# Patient Record
Sex: FEMALE | Race: WHITE | NOT HISPANIC OR LATINO | Employment: UNEMPLOYED | URBAN - METROPOLITAN AREA
[De-identification: names, ages, dates, MRNs, and addresses within clinical notes are randomized per-mention and may not be internally consistent; named-entity substitution may affect disease eponyms.]

---

## 2023-07-25 ENCOUNTER — TRANSCRIBE ORDERS (OUTPATIENT)
Dept: OBSTETRICS AND GYNECOLOGY | Facility: HOSPITAL | Age: 33
End: 2023-07-25
Payer: MEDICAID

## 2023-07-25 DIAGNOSIS — O28.3 ABNORMAL FETAL ULTRASOUND: Primary | ICD-10-CM

## 2023-07-25 DIAGNOSIS — O98.419 CHRONIC HEPATITIS C COMPLICATING PREGNANCY, ANTEPARTUM: ICD-10-CM

## 2023-07-25 DIAGNOSIS — O35.EXX0 RENAL AGENESIS OF FETUS AFFECTING ANTEPARTUM CARE OF MOTHER, SINGLE OR UNSPECIFIED FETUS: ICD-10-CM

## 2023-07-25 DIAGNOSIS — B18.2 CHRONIC HEPATITIS C COMPLICATING PREGNANCY, ANTEPARTUM: ICD-10-CM

## 2023-07-25 DIAGNOSIS — Z34.90 PREGNANCY, UNSPECIFIED GESTATIONAL AGE: ICD-10-CM

## 2023-07-25 DIAGNOSIS — O99.320 SUBSTANCE ABUSE AFFECTING PREGNANCY, ANTEPARTUM: ICD-10-CM

## 2023-09-13 ENCOUNTER — OFFICE VISIT (OUTPATIENT)
Dept: OBSTETRICS AND GYNECOLOGY | Facility: HOSPITAL | Age: 33
End: 2023-09-13
Payer: MEDICAID

## 2023-09-13 ENCOUNTER — HOSPITAL ENCOUNTER (OUTPATIENT)
Dept: WOMENS IMAGING | Facility: HOSPITAL | Age: 33
Discharge: HOME | End: 2023-09-13
Admitting: NURSE PRACTITIONER
Payer: MEDICAID

## 2023-09-13 VITALS
BODY MASS INDEX: 32.96 KG/M2 | HEIGHT: 63 IN | DIASTOLIC BLOOD PRESSURE: 47 MMHG | WEIGHT: 186 LBS | SYSTOLIC BLOOD PRESSURE: 110 MMHG

## 2023-09-13 DIAGNOSIS — O99.320 SUBSTANCE ABUSE AFFECTING PREGNANCY, ANTEPARTUM: ICD-10-CM

## 2023-09-13 DIAGNOSIS — O35.EXX0 RENAL AGENESIS OF FETUS AFFECTING ANTEPARTUM CARE OF MOTHER, SINGLE OR UNSPECIFIED FETUS: ICD-10-CM

## 2023-09-13 DIAGNOSIS — B18.2 CHRONIC HEPATITIS C COMPLICATING PREGNANCY, ANTEPARTUM: ICD-10-CM

## 2023-09-13 DIAGNOSIS — Z87.59 HISTORY OF UNEXPLAINED STILLBIRTH: ICD-10-CM

## 2023-09-13 DIAGNOSIS — Z34.90 PREGNANCY, UNSPECIFIED GESTATIONAL AGE: ICD-10-CM

## 2023-09-13 DIAGNOSIS — Q60.0 UNILATERAL RENAL AGENESIS: Primary | ICD-10-CM

## 2023-09-13 DIAGNOSIS — O98.419 CHRONIC HEPATITIS C COMPLICATING PREGNANCY, ANTEPARTUM: ICD-10-CM

## 2023-09-13 DIAGNOSIS — F19.11 HISTORY OF SUBSTANCE ABUSE: ICD-10-CM

## 2023-09-13 DIAGNOSIS — Z14.8 ALPHA-1-ANTITRYPSIN DEFICIENCY CARRIER: ICD-10-CM

## 2023-09-13 DIAGNOSIS — O28.3 ABNORMAL FETAL ULTRASOUND: ICD-10-CM

## 2023-09-13 DIAGNOSIS — Z3A.27 27 WEEKS GESTATION OF PREGNANCY: ICD-10-CM

## 2023-09-13 PROCEDURE — 76811 OB US DETAILED SNGL FETUS: CPT

## 2023-09-13 RX ORDER — CETIRIZINE HYDROCHLORIDE 10 MG/1
10 TABLET ORAL AS NEEDED
COMMUNITY
Start: 2023-08-30

## 2023-09-13 RX ORDER — PRENATAL VIT/IRON FUM/FOLIC AC 27MG-0.8MG
TABLET ORAL DAILY
COMMUNITY

## 2023-09-13 NOTE — ASSESSMENT & PLAN NOTE
The incidence of unilateral renal agenesis is approximately 1 in 1000 births (male to female ratio 1:1). When seen in isolation, without evidence of other structural abnormalities, this is not felt to be associated with chromosomal or genetic abnormalities, but is usually secondary to a vascular accident.     I reviewed with the patient and her  that children born with one functional kidney have normal life expectancy/quality of life. I recommended simply that they inform their Pediatrician of the possibility of a vestigial kidney so that the /infant could be evaluated after delivery.     I scheduled a follow-up ultrasound in 4 wks for reevaluation and growth.    Parents and siblings should be offered renal ultrasonograms to assess for unilateral renal agenesis or other renal anomalies.

## 2023-09-13 NOTE — LETTER
"2023     NAJMA Ballard  [address] [facility] [address]    Patient: Sandra Justice   YOB: 1990   Date of Visit: 2023       Dear NAJMA Ballard,    Thank you for referring Sandra Justice to me for evaluation. Below is a copy of my consult note.    If you have questions, please do not hesitate to call me. I look forward to following Sandra along with you.         Sincerely,        Michelle Larson MD        CC: No Recipients          Maternal/Fetal Medicine Consult Note     Name: Sandra Justice    : 1990     MRN: [id number]     Referring Provider: EUSEBIO Ballard*    Chief Complaint  Right renal agenesis, h/o IUFD at 15wks, maternal carrier for alpha 1 antitrypsin deficiency, h/o substance abuse, Hep C antibody pos    Subjective     History of Present Illness:  Sandra Justice is a 33 y.o.  27w5d who presents today for fetal anatomic survey. She did not have genetic screening done.     She denies LOF/VB/ctx's.     JANNA: Estimated Date of Delivery: 23     ROS:   As noted in HPI.     Past Medical History:   Diagnosis Date   • Carrier of alpha-1-antitrypsin deficiency     pt reports she is carrier   • Family history of alpha 1 antitrypsin deficiency     previous child - age 10 years - different fob   • Family history of heart valve abnormality     pt's father had a deffective heart valve replaced at age 28 and had pacemaker      History reviewed. No pertinent surgical history.   OB History          4    Para   2    Term   1       1    AB   1    Living   1         SAB   1    IAB   0    Ectopic   0    Molar   0    Multiple   0    Live Births   1          Obstetric Comments   Fob #1 - Pregnancy #1  Fob #2 - Pregnancy #2 - 4     Pregnancy #1 - Alphatrypson 1                Objective     Vital Signs  /47   Ht 160 cm (63\")   Wt 84.4 kg (186 lb)   LMP 2023 (Approximate)   Estimated body mass index is 32.95 kg/m² as calculated " "from the following:    Height as of this encounter: 160 cm (63\").    Weight as of this encounter: 84.4 kg (186 lb).    Physical Exam  Constitutional:       Appearance: Normal appearance. She is normal weight.   HENT:      Head: Normocephalic and atraumatic.   Pulmonary:      Effort: Pulmonary effort is normal.   Musculoskeletal:         General: Normal range of motion.   Neurological:      General: No focal deficit present.      Mental Status: She is alert and oriented to person, place, and time.   Psychiatric:         Mood and Affect: Mood normal.         Behavior: Behavior normal.         Thought Content: Thought content normal.         Judgment: Judgment normal.     Ultrasound Impression:   Vtx, S=D, nl HENNY, posterior placenta, CL WNL, right renal agenesis confirmed, nl left kidney, no other abnormalities identified.    Assessment and Plan     Diagnoses and all orders for this visit:    1. Unilateral renal agenesis (Primary)  Assessment & Plan:  The incidence of unilateral renal agenesis is approximately 1 in 1000 births (male to female ratio 1:1). When seen in isolation, without evidence of other structural abnormalities, this is not felt to be associated with chromosomal or genetic abnormalities, but is usually secondary to a vascular accident.     I reviewed with the patient and her  that children born with one functional kidney have normal life expectancy/quality of life. I recommended simply that they inform their Pediatrician of the possibility of a vestigial kidney so that the /infant could be evaluated after delivery.     I scheduled a follow-up ultrasound in 4 wks for reevaluation and growth.    Parents and siblings should be offered renal ultrasonograms to assess for unilateral renal agenesis or other renal anomalies.        Orders:  -      [facility]    2. History of unexplained stillbirth  -      [facility]    3. " Alpha-1-antitrypsin deficiency carrier  -     [facility]; [facility]    4. History of substance abuse  -     [facility]; [facility]    5. Chronic hepatitis C complicating pregnancy, antepartum  -     [facility]; [facility]    6. 27 weeks gestation of pregnancy         Follow Up  Return in about 4 weeks (around 10/11/2023).    I spent 30 minutes caring for the patient on the day of service. This included: obtaining or reviewing a separately obtained medical history, reviewing patient records, performing a medically appropriate exam and/or evaluation, counseling or educating the patient/family/caregiver, ordering medications, labs, and/or procedures and documenting such in the medical record. This does not include time spent on review and interpretation of other tests such as fetal ultrasound or the performance of other procedures such as amniocentesis or CVS.      Michelle Larson MD  2023

## 2023-09-13 NOTE — PROGRESS NOTES
Pt denies any history of substance abuse.  Pt reports family history of Previous child with Antitrypson 1 and pt's father with defective heart valve - had heart surgery at age 28 and a pacemaker  Next f/u with [facility] on 9/22  NIPT neg

## 2023-09-13 NOTE — PROGRESS NOTES
"    Maternal/Fetal Medicine Consult Note     Name: Sandra Justice    : 1990     MRN: [id number]     Referring Provider: EUSEBIO Ballard*    Chief Complaint  Right renal agenesis, h/o IUFD at 15wks, maternal carrier for alpha 1 antitrypsin deficiency, h/o substance abuse, Hep C antibody pos    Subjective     History of Present Illness:  Sandra Justice is a 33 y.o.  27w5d who presents today for fetal anatomic survey. She did not have genetic screening done.     She denies LOF/VB/ctx's.     JANNA: Estimated Date of Delivery: 23     ROS:   As noted in HPI.     Past Medical History:   Diagnosis Date    Carrier of alpha-1-antitrypsin deficiency     pt reports she is carrier    Family history of alpha 1 antitrypsin deficiency     previous child - age 12 years - different fob    Family history of heart valve abnormality     pt's father had a deffective heart valve replaced at age 28 and had pacemaker      History reviewed. No pertinent surgical history.   OB History          4    Para   2    Term   1       1    AB   1    Living   1         SAB   1    IAB   0    Ectopic   0    Molar   0    Multiple   0    Live Births   1          Obstetric Comments   Fob #1 - Pregnancy #1  Fob #2 - Pregnancy #2 - 4     Pregnancy #1 - Alphatrypson 1                Objective     Vital Signs  /47   Ht 160 cm (63\")   Wt 84.4 kg (186 lb)   LMP 2023 (Approximate)   Estimated body mass index is 32.95 kg/m² as calculated from the following:    Height as of this encounter: 160 cm (63\").    Weight as of this encounter: 84.4 kg (186 lb).    Physical Exam  Constitutional:       Appearance: Normal appearance. She is normal weight.   HENT:      Head: Normocephalic and atraumatic.   Pulmonary:      Effort: Pulmonary effort is normal.   Musculoskeletal:         General: Normal range of motion.   Neurological:      General: No focal deficit present.      Mental Status: She is alert and oriented to person, " place, and time.   Psychiatric:         Mood and Affect: Mood normal.         Behavior: Behavior normal.         Thought Content: Thought content normal.         Judgment: Judgment normal.     Ultrasound Impression:   Vtx, S=D, nl HENNY, posterior placenta, CL WNL, right renal agenesis confirmed, nl left kidney, no other abnormalities identified.    Assessment and Plan     Diagnoses and all orders for this visit:    1. Unilateral renal agenesis (Primary)  Assessment & Plan:  The incidence of unilateral renal agenesis is approximately 1 in 1000 births (male to female ratio 1:1). When seen in isolation, without evidence of other structural abnormalities, this is not felt to be associated with chromosomal or genetic abnormalities, but is usually secondary to a vascular accident.     I reviewed with the patient and her  that children born with one functional kidney have normal life expectancy/quality of life. I recommended simply that they inform their Pediatrician of the possibility of a vestigial kidney so that the /infant could be evaluated after delivery.     I scheduled a follow-up ultrasound in 4 wks for reevaluation and growth.    Parents and siblings should be offered renal ultrasonograms to assess for unilateral renal agenesis or other renal anomalies.        Orders:  -      [facility]    2. History of unexplained stillbirth  -      [facility]    3. Alpha-1-antitrypsin deficiency carrier  -      [facility]; [facility]    4. History of substance abuse  -     [facility]; Future    5. Chronic hepatitis C complicating pregnancy, antepartum  -      [facility]; [facility]    6. 27 weeks gestation of pregnancy         Follow Up  Return in about 4 weeks (around 10/11/2023).    I spent 30 minutes caring for the patient on the day of service. This included: obtaining or reviewing a  separately obtained medical history, reviewing patient records, performing a medically appropriate exam and/or evaluation, counseling or educating the patient/family/caregiver, ordering medications, labs, and/or procedures and documenting such in the medical record. This does not include time spent on review and interpretation of other tests such as fetal ultrasound or the performance of other procedures such as amniocentesis or CVS.      Michelle Larson MD  09/13/2023

## 2023-10-18 ENCOUNTER — HOSPITAL ENCOUNTER (OUTPATIENT)
Dept: WOMENS IMAGING | Facility: HOSPITAL | Age: 33
Discharge: HOME | End: 2023-10-18
Admitting: OBSTETRICS & GYNECOLOGY
Payer: MEDICAID

## 2023-10-18 ENCOUNTER — OFFICE VISIT (OUTPATIENT)
Dept: OBSTETRICS AND GYNECOLOGY | Facility: HOSPITAL | Age: 33
End: 2023-10-18
Payer: MEDICAID

## 2023-10-18 VITALS — BODY MASS INDEX: 35.57 KG/M2 | DIASTOLIC BLOOD PRESSURE: 72 MMHG | SYSTOLIC BLOOD PRESSURE: 128 MMHG | WEIGHT: 200.8 LBS

## 2023-10-18 DIAGNOSIS — F19.11 HISTORY OF SUBSTANCE ABUSE: ICD-10-CM

## 2023-10-18 DIAGNOSIS — O98.419 CHRONIC HEPATITIS C COMPLICATING PREGNANCY, ANTEPARTUM: ICD-10-CM

## 2023-10-18 DIAGNOSIS — Q60.0 UNILATERAL RENAL AGENESIS: Primary | ICD-10-CM

## 2023-10-18 DIAGNOSIS — Z87.59 HISTORY OF UNEXPLAINED STILLBIRTH: ICD-10-CM

## 2023-10-18 DIAGNOSIS — Z14.8 ALPHA-1-ANTITRYPSIN DEFICIENCY CARRIER: ICD-10-CM

## 2023-10-18 DIAGNOSIS — B18.2 CHRONIC HEPATITIS C COMPLICATING PREGNANCY, ANTEPARTUM: ICD-10-CM

## 2023-10-18 DIAGNOSIS — Z3A.32 32 WEEKS GESTATION OF PREGNANCY: ICD-10-CM

## 2023-10-18 DIAGNOSIS — Q60.0 UNILATERAL RENAL AGENESIS: ICD-10-CM

## 2023-10-18 PROCEDURE — 76816 OB US FOLLOW-UP PER FETUS: CPT

## 2023-10-18 PROCEDURE — 76820 UMBILICAL ARTERY ECHO: CPT

## 2023-10-18 PROCEDURE — 76819 FETAL BIOPHYS PROFIL W/O NST: CPT

## 2023-10-18 NOTE — PROGRESS NOTES
Patient seen in Maternal Fetal Medicine clinic today. Please see full note in under imaging tab of patient chart in Epic (Viewpoint report).    Michelle Larson MD

## 2023-11-08 ENCOUNTER — HOSPITAL ENCOUNTER (OUTPATIENT)
Dept: WOMENS IMAGING | Facility: HOSPITAL | Age: 33
Discharge: HOME | End: 2023-11-08
Admitting: OBSTETRICS & GYNECOLOGY
Payer: MEDICAID

## 2023-11-08 ENCOUNTER — OFFICE VISIT (OUTPATIENT)
Dept: OBSTETRICS AND GYNECOLOGY | Facility: HOSPITAL | Age: 33
End: 2023-11-08
Payer: MEDICAID

## 2023-11-08 VITALS — SYSTOLIC BLOOD PRESSURE: 139 MMHG | BODY MASS INDEX: 36.31 KG/M2 | DIASTOLIC BLOOD PRESSURE: 74 MMHG | WEIGHT: 205 LBS

## 2023-11-08 DIAGNOSIS — B18.2 CHRONIC HEPATITIS C COMPLICATING PREGNANCY, ANTEPARTUM: ICD-10-CM

## 2023-11-08 DIAGNOSIS — F19.11 HISTORY OF SUBSTANCE ABUSE: ICD-10-CM

## 2023-11-08 DIAGNOSIS — Q60.0 UNILATERAL RENAL AGENESIS: ICD-10-CM

## 2023-11-08 DIAGNOSIS — O98.419 CHRONIC HEPATITIS C COMPLICATING PREGNANCY, ANTEPARTUM: ICD-10-CM

## 2023-11-08 DIAGNOSIS — Z87.59 HISTORY OF UNEXPLAINED STILLBIRTH: ICD-10-CM

## 2023-11-08 DIAGNOSIS — Z14.8 ALPHA-1-ANTITRYPSIN DEFICIENCY CARRIER: ICD-10-CM

## 2023-11-08 DIAGNOSIS — Q60.0 UNILATERAL RENAL AGENESIS: Primary | ICD-10-CM

## 2023-11-08 PROCEDURE — 76819 FETAL BIOPHYS PROFIL W/O NST: CPT

## 2023-11-08 PROCEDURE — 76820 UMBILICAL ARTERY ECHO: CPT

## 2023-11-08 PROCEDURE — 76816 OB US FOLLOW-UP PER FETUS: CPT

## 2023-11-08 NOTE — ASSESSMENT & PLAN NOTE
Again noted right renal agenesis with normal appearing left kidney. Normal fluid    pediatric evaluation recommended.

## 2023-11-08 NOTE — PROGRESS NOTES
Patient denies bleeding, leaking fluid or contractions  NIPT negative  Patients next follow up with JAGUAR YAO is 11/9/2023

## 2023-11-08 NOTE — PROGRESS NOTES
Anesthesia Evaluation     Patient summary reviewed and Nursing notes reviewed   history of anesthetic complications: PONV  NPO Solid Status: > 8 hours  NPO Liquid Status: > 8 hours           Airway   Mallampati: II  TM distance: >3 FB  Neck ROM: full  Dental      Pulmonary    (+) a smoker Current, COPD, shortness of breath, decreased breath sounds,   Cardiovascular     PT is on anticoagulation therapy    (+) pacemaker, hypertension, HENDERSON, hyperlipidemia,       Neuro/Psych  (+) dizziness/light headedness, psychiatric history Anxiety and Depression,     GI/Hepatic/Renal/Endo    (+)  GERD,  renal disease stones and CRI, diabetes mellitus type 2,     Musculoskeletal     (+) arthralgias, back pain, chronic pain, myalgias,   Abdominal    Substance History      OB/GYN          Other   arthritis,                    Anesthesia Plan    ASA 3     MAC   (Risks and benefits discussed including risk of aspiration, recall and dental damage. All patient questions answered.    Will continue with plan of care.)  intravenous induction     Anesthetic plan, all risks, benefits, and alternatives have been provided, discussed and informed consent has been obtained with: patient.       "    Maternal/Fetal Medicine Follow Up Note     Name: Sandra Justice    : 1990     MRN: [id number]     Referring Provider: EUSEBIO Ballard*    Chief Complaint  AC lag    Subjective     History of Present Illness:  Sandra Justice is a 33 y.o.  35w5d who presents today for follow up in the setting of right renal agenesis, concern for AC lag, history of 15 week loss, history of substance abuse   Overall well with no interval issues     JANNA: Estimated Date of Delivery: 23     ROS:   As noted in HPI.     Objective     Vital Signs  /74   Wt 93 kg (205 lb)   LMP 2023 (Approximate)   Estimated body mass index is 36.31 kg/m² as calculated from the following:    Height as of 23: 160 cm (63\").    Weight as of this encounter: 93 kg (205 lb).    Ultrasound Impression:   See viewpoint    Assessment and Plan     Sandra Justice is a 33 y.o.  35w5d     Diagnoses and all orders for this visit:    1. Unilateral renal agenesis (Primary)  Assessment & Plan:  Again noted right renal agenesis with normal appearing left kidney. Normal fluid    pediatric evaluation recommended.         2. History of unexplained stillbirth    3. History of substance abuse    4. Chronic hepatitis C complicating pregnancy, antepartum     5. AC lag - not noted today. Appropriate fetal growth     Follow Up  No follow-ups on file.    I spent 30 minutes caring for the patient on the day of service. This included: obtaining or reviewing a separately obtained medical history, reviewing patient records, performing a medically appropriate exam and/or evaluation, counseling or educating the patient/family/caregiver, ordering medications, labs, and/or procedures and documenting such in the medical record. This does not include time spent on review and interpretation of other tests such as fetal ultrasound or the performance of other procedures such as amniocentesis or CVS.    Taryn Kim MD " FACOG  Maternal Fetal Medicine, [facility]     2023